# Patient Record
Sex: FEMALE | Race: BLACK OR AFRICAN AMERICAN | Employment: STUDENT | URBAN - METROPOLITAN AREA
[De-identification: names, ages, dates, MRNs, and addresses within clinical notes are randomized per-mention and may not be internally consistent; named-entity substitution may affect disease eponyms.]

---

## 2017-06-09 ENCOUNTER — OFFICE VISIT (OUTPATIENT)
Dept: INTERNAL MEDICINE CLINIC | Age: 27
End: 2017-06-09

## 2017-06-09 VITALS
WEIGHT: 170.6 LBS | RESPIRATION RATE: 16 BRPM | BODY MASS INDEX: 30.23 KG/M2 | OXYGEN SATURATION: 99 % | TEMPERATURE: 98.3 F | SYSTOLIC BLOOD PRESSURE: 114 MMHG | HEART RATE: 73 BPM | HEIGHT: 63 IN | DIASTOLIC BLOOD PRESSURE: 72 MMHG

## 2017-06-09 DIAGNOSIS — R63.5 WEIGHT GAIN: ICD-10-CM

## 2017-06-09 DIAGNOSIS — F41.8 DEPRESSION WITH ANXIETY: ICD-10-CM

## 2017-06-09 DIAGNOSIS — E66.9 OBESITY (BMI 30-39.9): ICD-10-CM

## 2017-06-09 DIAGNOSIS — F51.04 PSYCHOPHYSIOLOGICAL INSOMNIA: Primary | ICD-10-CM

## 2017-06-09 DIAGNOSIS — Z23 ENCOUNTER FOR IMMUNIZATION: ICD-10-CM

## 2017-06-09 RX ORDER — HYDROXYZINE 25 MG/1
25 TABLET, FILM COATED ORAL
Qty: 30 TAB | Refills: 0 | Status: SHIPPED | OUTPATIENT
Start: 2017-06-09 | End: 2017-06-19

## 2017-06-09 RX ORDER — FLUOXETINE HYDROCHLORIDE 20 MG/1
CAPSULE ORAL
Qty: 60 CAP | Refills: 1 | Status: SHIPPED | OUTPATIENT
Start: 2017-06-09 | End: 2017-08-12 | Stop reason: SDUPTHER

## 2017-06-09 NOTE — PATIENT INSTRUCTIONS
It was a pleasure to see you! As discussed: You received the tetanus/pertussis vaccine today. Please see vaccine handout for more information and let us know if you have any reactions or concerns after the vaccination. Anxiety  I have prescribed hydroxyzine a nonhabit forming medication for use as needed for anxiety along with Prozac. Take as prescribed. Try eating \"mood foods\"   Exercise at least 5 mins a day   See below for more information         Anxiety Disorder: Care Instructions  Your Care Instructions  Anxiety is a normal reaction to stress. Difficult situations can cause you to have symptoms such as sweaty palms and a nervous feeling. In an anxiety disorder, the symptoms are far more severe. Constant worry, muscle tension, trouble sleeping, nausea and diarrhea, and other symptoms can make normal daily activities difficult or impossible. These symptoms may occur for no reason, and they can affect your work, school, or social life. Medicines, counseling, and self-care can all help. Follow-up care is a key part of your treatment and safety. Be sure to make and go to all appointments, and call your doctor if you are having problems. It's also a good idea to know your test results and keep a list of the medicines you take. How can you care for yourself at home? · Take medicines exactly as directed. Call your doctor if you think you are having a problem with your medicine. · Go to your counseling sessions and follow-up appointments. · Recognize and accept your anxiety. Then, when you are in a situation that makes you anxious, say to yourself, \"This is not an emergency. I feel uncomfortable, but I am not in danger. I can keep going even if I feel anxious. \"  · Be kind to your body:  ¨ Relieve tension with exercise or a massage. ¨ Get enough rest.  ¨ Avoid alcohol, caffeine, nicotine, and illegal drugs. They can increase your anxiety level and cause sleep problems.   ¨ Learn and do relaxation techniques. See below for more about these techniques. · Engage your mind. Get out and do something you enjoy. Go to a funny movie, or take a walk or hike. Plan your day. Having too much or too little to do can make you anxious. · Keep a record of your symptoms. Discuss your fears with a good friend or family member, or join a support group for people with similar problems. Talking to others sometimes relieves stress. · Get involved in social groups, or volunteer to help others. Being alone sometimes makes things seem worse than they are. · Get at least 30 minutes of exercise on most days of the week to relieve stress. Walking is a good choice. You also may want to do other activities, such as running, swimming, cycling, or playing tennis or team sports. Relaxation techniques  Do relaxation exercises 10 to 20 minutes a day. You can play soothing, relaxing music while you do them, if you wish. · Tell others in your house that you are going to do your relaxation exercises. Ask them not to disturb you. · Find a comfortable place, away from all distractions and noise. · Lie down on your back, or sit with your back straight. · Focus on your breathing. Make it slow and steady. · Breathe in through your nose. Breathe out through either your nose or mouth. · Breathe deeply, filling up the area between your navel and your rib cage. Breathe so that your belly goes up and down. · Do not hold your breath. · Breathe like this for 5 to 10 minutes. Notice the feeling of calmness throughout your whole body. As you continue to breathe slowly and deeply, relax by doing the following for another 5 to 10 minutes:  · Tighten and relax each muscle group in your body. You can begin at your toes and work your way up to your head. · Imagine your muscle groups relaxing and becoming heavy. · Empty your mind of all thoughts. · Let yourself relax more and more deeply.   · Become aware of the state of calmness that surrounds you.  · When your relaxation time is over, you can bring yourself back to alertness by moving your fingers and toes and then your hands and feet and then stretching and moving your entire body. Sometimes people fall asleep during relaxation, but they usually wake up shortly afterward. · Always give yourself time to return to full alertness before you drive a car or do anything that might cause an accident if you are not fully alert. Never play a relaxation tape while you drive a car. When should you call for help? Call 911 anytime you think you may need emergency care. For example, call if:  · You feel you cannot stop from hurting yourself or someone else. Keep the numbers for these national suicide hotlines: 8-758-837-TALK (1-955-773-803.283.4416) and 2-117-OEMGSVJ (9-048-150-447.331.3704). If you or someone you know talks about suicide or feeling hopeless, get help right away. Watch closely for changes in your health, and be sure to contact your doctor if:  · You have anxiety or fear that affects your life. · You have symptoms of anxiety that are new or different from those you had before. Where can you learn more? Go to http://jesus-travon.info/. Enter P754 in the search box to learn more about \"Anxiety Disorder: Care Instructions. \"  Current as of: July 26, 2016  Content Version: 11.2  © 4944-5069 [employer]. Care instructions adapted under license by [employer] (which disclaims liability or warranty for this information). If you have questions about a medical condition or this instruction, always ask your healthcare professional. [address] any warranty or liability for your use of this information.

## 2017-06-09 NOTE — PROGRESS NOTES
HISTORY OF PRESENT ILLNESS  Colette Story is a 23 y.o. female. HPI   Cardiovascular Review  The patient has obesity. Diet and Lifestyle: nonsmoker,  has recently changed her diet- salads and healthy snack   Home BP Monitoring: is not measured at home. Pertinent ROS: taking medications as instructed, no medication side effects noted, no TIA's, no chest pain on exertion, no dyspnea on exertion, no swelling of ankles. Depression with anxiety   Patient is seen for followup of depression. Treatment includes no meds and group therapy. Plans to start individual.   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    she denies suicidal thoughts without plan and suicidal thoughts with specific plan. she experiences the following side effects from the treatment: none. Review of Systems   Constitutional: Negative for diaphoresis, fever and weight loss. Eyes: Negative for blurred vision and pain. Respiratory: Negative for shortness of breath. Cardiovascular: Negative for chest pain, orthopnea and leg swelling. Neurological: Negative for focal weakness and headaches. Psychiatric/Behavioral: Negative for depression. Patient Active Problem List    Diagnosis Date Noted    Obesity (BMI 30-39.9) 06/09/2017    Depression with anxiety 06/09/2017    Dysthymia 12/29/2016    GERD (gastroesophageal reflux disease) 02/11/2016    Allergic rhinitis 08/11/2011       Current Outpatient Prescriptions   Medication Sig Dispense Refill    FLUoxetine (PROZAC) 20 mg capsule Week 1: Take 1 tab by mouth in AM, Week 2 & beyond: take 2 tabs by mouth in AM 60 Cap 1    hydrOXYzine HCl (ATARAX) 25 mg tablet Take 1 Tab by mouth three (3) times daily as needed for Anxiety for up to 10 days.  30 Tab 0    ranitidine (ZANTAC) 300 mg tablet Take 1 Tab by mouth daily. 30 Tab 2    ibuprofen (MOTRIN) 800 mg tablet Take 1 Tab by mouth every eight (8) hours as needed (Headache). 30 Tab 0    fluticasone (FLONASE) 50 mcg/actuation nasal spray nightly.  bacitracin (BACITRACIN) ointment Apply  to affected area two (2) times a day. 30 g 0    APRI 0.15-0.03 mg per tablet          No Known Allergies   Visit Vitals    /72 (BP 1 Location: Right arm, BP Patient Position: Sitting)    Pulse 73    Temp 98.3 °F (36.8 °C) (Oral)    Resp 16    Ht 5' 3\" (1.6 m)    Wt 170 lb 9.6 oz (77.4 kg)    SpO2 99%    BMI 30.22 kg/m2       Physical Exam   Constitutional: She is oriented to person, place, and time. No distress. Cardiovascular: Normal rate, regular rhythm and normal heart sounds. Pulmonary/Chest: Breath sounds normal. No respiratory distress. She has no wheezes. She has no rales. Neurological: She is alert and oriented to person, place, and time. Psychiatric:   Tearful intermittent        ASSESSMENT and Chio Smith was seen today for weight management and depression. Diagnoses and all orders for this visit:    Psychophysiological insomnia- see below     Depression with anxiety -  Will start  Prozac and titrate as need, Risk vs benefits discussed. Atarax  bridge for severe sx. Also explained the biochemical basis of anxiety and the need for long term maintenance therapy. Instructed patient t Patient verbalized understandingo contact office or 911 promptly should condition worsen or any new symptoms appear and provided on-call telephone numbers. IF THE PATIENT HAS ANY SUICIDAL OR HOMICIDAL IDEATION, CALL THE OFFICE, DISCUSS WITH A SUPPORT MEMBER OR GO TO THE ER IMMEDIATELY. Patient was agreeable with this plan. -     CBC WITH AUTOMATED DIFF  -     METABOLIC PANEL, COMPREHENSIVE  -     THYROID PANEL  -     TSH 3RD GENERATION  -     FLUoxetine (PROZAC) 20 mg capsule;  Week 1: Take 1 tab by mouth in AM, Week 2 & beyond: take 2 tabs by mouth in AM  -     hydrOXYzine HCl (ATARAX) 25 mg tablet; Take 1 Tab by mouth three (3) times daily as needed for Anxiety for up to 10 days. Weight gain  -     VITAMIN D, 25 HYDROXY  -     HEMOGLOBIN A1C WITH EAG    Obesity (BMI 30-39. 9)- I have reviewed/discussed the above normal BMI with the patient. I have recommended the following interventions: dietary management education, guidance, and counseling . Dana Wan -     VITAMIN D, 25 HYDROXY  -     HEMOGLOBIN A1C WITH EAG    Encounter for immunization - last tdap >5 yrs. Requests booster as she has had 2 needlesticks at work (from her colleagues)   -     TETANUS, DIPHTHERIA TOXOIDS AND ACELLULAR PERTUSSIS VACCINE (TDAP), IN INDIVIDS. >=7, IM  -     ND IMMUNIZ ADMIN,1 SINGLE/COMB VAC/TOXOID    Other orders  -     Cancel: LIPID PANEL      Follow-up Disposition:  Return in about 3 months (around 9/9/2017) for Follow-up In office (send All4Staff message in 4-6 weeks) . Medication risks/benefits/costs/interactions/alternatives discussed with patient. Enio Lawrencealonso  was given an after visit summary which includes diagnoses, current medications, & vitals. she expressed understanding with the diagnosis and plan.

## 2017-06-09 NOTE — PROGRESS NOTES
Chief Complaint   Patient presents with    Weight Management    Depression     1. Have you been to the ER, urgent care clinic since your last visit? Hospitalized since your last visit? No    2. Have you seen or consulted any other health care providers outside of the [address] [place] [address] since your last visit? Include any pap smears or colon screening. No     Patient states would like to start antidepressant    Patient received Tdap in office today. Administered Boostrix 0.5 ml in right deltoid, IM.  Pt tolerated well

## 2017-11-02 ENCOUNTER — OFFICE VISIT (OUTPATIENT)
Dept: INTERNAL MEDICINE CLINIC | Age: 27
End: 2017-11-02

## 2017-11-02 VITALS
TEMPERATURE: 98.3 F | HEIGHT: 63 IN | BODY MASS INDEX: 32.11 KG/M2 | DIASTOLIC BLOOD PRESSURE: 64 MMHG | SYSTOLIC BLOOD PRESSURE: 106 MMHG | RESPIRATION RATE: 16 BRPM | OXYGEN SATURATION: 100 % | HEART RATE: 79 BPM | WEIGHT: 181.2 LBS

## 2017-11-02 DIAGNOSIS — K59.00 CONSTIPATION, UNSPECIFIED CONSTIPATION TYPE: ICD-10-CM

## 2017-11-02 DIAGNOSIS — R10.9 ABDOMINAL PAIN, UNSPECIFIED ABDOMINAL LOCATION: Primary | ICD-10-CM

## 2017-11-02 LAB
BILIRUB UR QL STRIP: NORMAL
GLUCOSE UR-MCNC: NEGATIVE MG/DL
KETONES P FAST UR STRIP-MCNC: NEGATIVE MG/DL
PH UR STRIP: 6 [PH] (ref 4.6–8)
PROT UR QL STRIP: NORMAL MG/DL
SP GR UR STRIP: 1.02 (ref 1–1.03)
UA UROBILINOGEN AMB POC: NORMAL (ref 0.2–1)
URINALYSIS CLARITY POC: NORMAL
URINALYSIS COLOR POC: NORMAL
URINE BLOOD POC: NORMAL
URINE LEUKOCYTES POC: NORMAL
URINE NITRITES POC: NEGATIVE

## 2017-11-02 RX ORDER — NITROFURANTOIN 25; 75 MG/1; MG/1
100 CAPSULE ORAL 2 TIMES DAILY
Qty: 10 CAP | Refills: 0 | Status: SHIPPED | OUTPATIENT
Start: 2017-11-02 | End: 2018-10-04 | Stop reason: ALTCHOICE

## 2017-11-02 NOTE — MR AVS SNAPSHOT
Visit Information Date & Time Provider Department Dept. Phone Encounter #  
 11/2/2017  1:30 PM Sarah Brandt, [address] [facility] [phone] [id number] Upcoming Health Maintenance Date Due INFLUENZA AGE 9 TO ADULT 8/1/2017 PAP AKA CERVICAL CYTOLOGY 9/8/2018 DTaP/Tdap/Td series (3 - Td) 6/9/2027 Allergies as of 11/2/2017  Review Complete On: 11/2/2017 By: Naveen Flores LPN No Known Allergies Current Immunizations  Reviewed on 11/24/2015 Name Date H1N1 FLU VACCINE 10/1/2009 Hepatitis A Vaccine 6/16/2009, 3/24/2009 Hepatitis B Vaccine 8/7/1992, 6/25/1992, 4/23/1992 Human Papillomavirus 10/3/2007, 3/20/2007, 10/24/2006 Influenza Vaccine 10/16/2014 MMR Vaccine 10/22/2002, 11/24/1992 Meningococcal Vaccine 3/24/2009 TD Vaccine 4/23/1993 TDAP Vaccine 3/24/2009 Tdap 6/9/2017 Not reviewed this visit You Were Diagnosed With   
  
 Codes Comments Abdominal pain, unspecified abdominal location    -  Primary ICD-10-CM: R10.9 ICD-9-CM: 789.00 Constipation, unspecified constipation type     ICD-10-CM: K59.00 ICD-9-CM: 564.00 Vitals BP Pulse Temp Resp Height(growth percentile) Weight(growth percentile) 106/64 (BP 1 Location: Right arm, BP Patient Position: Sitting) 79 98.3 °F (36.8 °C) (Oral) 16 5' 3\" (1.6 m) 181 lb 3.2 oz (82.2 kg) LMP SpO2 BMI OB Status Smoking Status 10/30/2017 100% 32.1 kg/m2 Having regular periods Never Smoker Vitals History BMI and BSA Data Body Mass Index Body Surface Area  
 32.1 kg/m 2 1.91 m 2 Preferred Pharmacy Pharmacy Name Phone [id number], [place] - [address] [phone] Your Updated Medication List  
  
   
This list is accurate as of: 11/2/17  1:58 PM.  Always use your most recent med list.  
  
  
  
  
 APRI 0.15-0.03 mg Tab Generic drug:  desogestrel-ethinyl estradiol  
  
 bacitracin zinc ointment Commonly known as:  BACITRACIN Apply  to affected area two (2) times a day. FLONASE 50 mcg/actuation nasal spray Generic drug:  fluticasone  
nightly. FLUoxetine 20 mg capsule Commonly known as:  PROzac WEEK 1: TAKE 1 TAB BY MOUTH IN MORNING, WEEK 2 & BEYOND: TAKE 2 TABS BY MOUTH IN MORNING  
  
 ibuprofen 800 mg tablet Commonly known as:  MOTRIN Take 1 Tab by mouth every eight (8) hours as needed (Headache). nitrofurantoin (macrocrystal-monohydrate) 100 mg capsule Commonly known as:  MACROBID Take 1 Cap by mouth two (2) times a day. raNITIdine 300 mg tablet Commonly known as:  ZANTAC Take 1 Tab by mouth daily. Prescriptions Sent to Pharmacy Refills  
 nitrofurantoin, macrocrystal-monohydrate, (MACROBID) 100 mg capsule 0 Sig: Take 1 Cap by mouth two (2) times a day. Class: Normal  
 Pharmacy: [facility]/pharmacy [address] [place] [address] [address]  #: [phone] Route: Oral  
  
We Performed the Following CULTURE, URINE [id number] CPT(R)] URINALYSIS W/ RFLX MICROSCOPIC [25849 CPT(R)] Patient Instructions Miralax 1 capful in 8 oz water daily Macrobid one twice daily x 5 days Increase your fluids Call or return to clinic if these symptoms worsen or fail to improve as anticipated. Introducing [facility]! Dear Kaylyn Lainez: Thank you for requesting a [email or web address] account. Our records indicate that you already have an active [email or web address] account. You can access your account anytime at [email or web address] Did you know that you can access your hospital and ER discharge instructions at any time in [email or web address]? You can also review all of your test results from your hospital stay or ER visit. Additional Information If you have questions, please visit the Frequently Asked Questions section of the [employer] website at [email or web address] Remember, [employer]ire is NOT to be used for urgent needs. For medical emergencies, dial 911. Now available from your iPhone and Android! Please provide this summary of care documentation to your next provider. Your primary care clinician is listed as Gerardo Haines. If you have any questions after today's visit, please call [phone].

## 2017-11-02 NOTE — PROGRESS NOTES
HISTORY OF PRESENT ILLNESS  Ace Huddleston is a 33 y.o. female. HPI  Patient reports 2 day history of abdominal fullness, abdominal cramping and bloating with constipation. She has taken Bicitrate and Metamucil, with good bowel movement results, but still feels constipated. She admits to not typically drinking a lot of fluids, and has tried to increase her water x 2 days. She denies upper abdominal pain, nausea or vomiting. She denies dysuria, or frequency. Denies vaginal discharge, or fevers. She is currently menstrurating. IUD placed early October. Past Medical History:   Diagnosis Date    Headache       Current Outpatient Prescriptions on File Prior to Visit   Medication Sig Dispense Refill    FLUoxetine (PROZAC) 20 mg capsule WEEK 1: TAKE 1 TAB BY MOUTH IN MORNING, WEEK 2 & BEYOND: TAKE 2 TABS BY MOUTH IN MORNING 60 Cap 2    ranitidine (ZANTAC) 300 mg tablet Take 1 Tab by mouth daily. 30 Tab 2    ibuprofen (MOTRIN) 800 mg tablet Take 1 Tab by mouth every eight (8) hours as needed (Headache). 30 Tab 0    fluticasone (FLONASE) 50 mcg/actuation nasal spray nightly.  bacitracin (BACITRACIN) ointment Apply  to affected area two (2) times a day. 30 g 0    APRI 0.15-0.03 mg per tablet        No current facility-administered medications on file prior to visit. ROS  Per [facility]  Physical Exam  Visit Vitals    /64 (BP 1 Location: Right arm, BP Patient Position: Sitting)    Pulse 79    Temp 98.3 °F (36.8 °C) (Oral)    Resp 16    Ht 5' 3\" (1.6 m)    Wt 181 lb 3.2 oz (82.2 kg)    LMP 10/30/2017    SpO2 100%    BMI 32.1 kg/m2    Patient appears well  Heart[de-identified] normal rate, regular rhythm, normal S1, S2, no murmurs, rubs, clicks or gallops.   Chest: clear to auscultation, no wheezes, rales or rhonchi,   Abdomen: soft, normal bowel sounds no mass or hepatosplenomegaly, mild suprapubic tenderness, without rebound   Extremities: no edema  Urine dip: 2+ blood, 1+ leuk, 1+bili 1+ protein  ASSESSMENT and PLAN  UTI : urine culture  Macrobid bid x 5 days  Constipation: Miralax 1 capful in 8 oz water daily  Increase fluids  Call or return to clinic prn if these symptoms worsen or fail to improve as anticipated. Follow-up Disposition:  Return if symptoms worsen or fail to improve. Advised to call back or return to office if symptoms worsen/change/persist.  Discussed expected course/resolution/complications of diagnosis in detail with patient. Medication risks/benefits/costs/interactions/alternatives discussed with patient. She was given an after visit summary which includes diagnoses, current medications, & vitals. She expressed understanding with the diagnosis and plan.

## 2017-11-02 NOTE — PATIENT INSTRUCTIONS
Miralax 1 capful in 8 oz water daily  Macrobid one twice daily x 5 days  Increase your fluids  Call or return to clinic if these symptoms worsen or fail to improve as anticipated.

## 2017-11-02 NOTE — PROGRESS NOTES
Chief Complaint   Patient presents with    Abdominal Pain     1. Have you been to the ER, urgent care clinic since your last visit? Hospitalized since your last visit? No    2. Have you seen or consulted any other health care providers outside of the [address] [place] [address] since your last visit? Include any pap smears or colon screening. No    Patient stated abdominal pain, started Tuesday.  Constipation

## 2017-11-03 LAB
APPEARANCE UR: ABNORMAL
BACTERIA #/AREA URNS HPF: ABNORMAL /[HPF]
BACTERIA UR CULT: NORMAL
BILIRUB UR QL STRIP: NEGATIVE
CASTS URNS QL MICRO: ABNORMAL /LPF
COLOR UR: YELLOW
EPI CELLS #/AREA URNS HPF: >10 /HPF
GLUCOSE UR QL: NEGATIVE
HGB UR QL STRIP: ABNORMAL
KETONES UR QL STRIP: NEGATIVE
LEUKOCYTE ESTERASE UR QL STRIP: ABNORMAL
MICRO URNS: ABNORMAL
MUCOUS THREADS URNS QL MICRO: PRESENT
NITRITE UR QL STRIP: NEGATIVE
PH UR STRIP: 6 [PH] (ref 5–7.5)
PROT UR QL STRIP: ABNORMAL
RBC #/AREA URNS HPF: ABNORMAL /HPF
SP GR UR: 1.02 (ref 1–1.03)
UROBILINOGEN UR STRIP-MCNC: 0.2 MG/DL (ref 0.2–1)
WBC #/AREA URNS HPF: ABNORMAL /HPF

## 2017-11-17 ENCOUNTER — TELEPHONE (OUTPATIENT)
Dept: INTERNAL MEDICINE CLINIC | Age: 27
End: 2017-11-17

## 2018-02-21 ENCOUNTER — TELEPHONE (OUTPATIENT)
Dept: INTERNAL MEDICINE CLINIC | Age: 28
End: 2018-02-21

## 2018-08-17 ENCOUNTER — OFFICE VISIT (OUTPATIENT)
Dept: INTERNAL MEDICINE CLINIC | Age: 28
End: 2018-08-17

## 2018-08-17 VITALS
TEMPERATURE: 99 F | WEIGHT: 176 LBS | OXYGEN SATURATION: 99 % | SYSTOLIC BLOOD PRESSURE: 102 MMHG | RESPIRATION RATE: 16 BRPM | DIASTOLIC BLOOD PRESSURE: 66 MMHG | HEIGHT: 63 IN | HEART RATE: 76 BPM | BODY MASS INDEX: 31.18 KG/M2

## 2018-08-17 DIAGNOSIS — F41.9 ANXIETY: Primary | ICD-10-CM

## 2018-08-17 DIAGNOSIS — Z00.00 ROUTINE GENERAL MEDICAL EXAMINATION AT A HEALTH CARE FACILITY: ICD-10-CM

## 2018-08-17 DIAGNOSIS — F32.A MILD DEPRESSION: ICD-10-CM

## 2018-08-17 RX ORDER — FLUOXETINE 20 MG/1
40 TABLET ORAL DAILY
Qty: 60 TAB | Refills: 2 | Status: SHIPPED | OUTPATIENT
Start: 2018-08-17 | End: 2018-10-04

## 2018-08-17 NOTE — MR AVS SNAPSHOT
[address] [place] [address] 
[phone] Patient: Ace Huddleston MRN: [id number] UGS:9/70/9022 Visit Information Date & Time Provider Department Dept. Phone Encounter #  
 8/17/2018  1:30 PM Caryle Macho, MD [facility] Internal Medicine [phone] [id number] Follow-up Instructions Return in about 6 weeks (around 9/28/2018) for Physical - 30 minute appointment. Upcoming Health Maintenance Date Due Influenza Age 5 to Adult 8/1/2018 PAP AKA CERVICAL CYTOLOGY 9/8/2018 DTaP/Tdap/Td series (3 - Td) 6/9/2027 Allergies as of 8/17/2018  Review Complete On: 8/17/2018 By: Caryle Macho, MD  
 No Known Allergies Current Immunizations  Reviewed on 11/24/2015 Name Date H1N1 FLU VACCINE 10/1/2009 Hepatitis A Vaccine 6/16/2009, 3/24/2009 Hepatitis B Vaccine 8/7/1992, 6/25/1992, 4/23/1992 Human Papillomavirus 10/3/2007, 3/20/2007, 10/24/2006 Influenza Vaccine 10/16/2014 MMR Vaccine 10/22/2002, 11/24/1992 Meningococcal Vaccine 3/24/2009 TD Vaccine 4/23/1993 TDAP Vaccine 3/24/2009 Tdap 6/9/2017 Not reviewed this visit You Were Diagnosed With   
  
 Codes Comments Anxiety    -  Primary ICD-10-CM: F41.9 ICD-9-CM: 300.00 Mild depression (HCC)     ICD-10-CM: F32.0 ICD-9-CM: 463 Routine general medical examination at a health care facility     ICD-10-CM: Z00.00 ICD-9-CM: V70.0 Vitals BP Pulse Temp Resp Height(growth percentile) Weight(growth percentile) 102/66 (BP 1 Location: Right arm, BP Patient Position: Sitting) 76 99 °F (37.2 °C) (Oral) 16 5' 3\" (1.6 m) 176 lb (79.8 kg) LMP SpO2 BMI OB Status Smoking Status 08/06/2018 99% 31.18 kg/m2 Having regular periods Never Smoker Vitals History BMI and BSA Data Body Mass Index Body Surface Area  
 31.18 kg/m 2 1.88 m 2 Preferred Pharmacy Pharmacy Name Phone [id number] [address] [phone] Your Updated Medication List  
  
   
This list is accurate as of 8/17/18  2:06 PM.  Always use your most recent med list.  
  
  
  
  
 APRI 0.15-0.03 mg Tab Generic drug:  desogestrel-ethinyl estradiol  
  
 bacitracin zinc ointment Commonly known as:  BACITRACIN Apply  to affected area two (2) times a day. FLONASE 50 mcg/actuation nasal spray Generic drug:  fluticasone  
nightly. FLUoxetine 20 mg tablet Commonly known as:  PROzac Take 2 Tabs by mouth daily. ibuprofen 800 mg tablet Commonly known as:  MOTRIN Take 1 Tab by mouth every eight (8) hours as needed (Headache). nitrofurantoin (macrocrystal-monohydrate) 100 mg capsule Commonly known as:  MACROBID Take 1 Cap by mouth two (2) times a day. raNITIdine 300 mg tablet Commonly known as:  ZANTAC Take 1 Tab by mouth daily. Prescriptions Sent to Pharmacy Refills FLUoxetine (PROZAC) 20 mg tablet 2 Sig: Take 2 Tabs by mouth daily. Class: Normal  
 Pharmacy: [facility]/pharmacy [address] [place] [address] [address] Ph #: [phone] Route: Oral  
  
We Performed the Following CBC WITH AUTOMATED DIFF [57453 CPT(R)] LIPID PANEL [49152 CPT(R)] METABOLIC PANEL, COMPREHENSIVE [08380 CPT(R)] T4, FREE [id number] CPT(R)] TSH 3RD GENERATION [52850 CPT(R)] VITAMIN D, 25 HYDROXY [id number] CPT(R)] Follow-up Instructions Return in about 6 weeks (around 9/28/2018) for Physical - 30 minute appointment. Patient Instructions It was a pleasure to see you! As discussed: 
 
Increase Prozac to 40mg daily. Think about what you want to do with this time off before you match. Continue seeing your counselor and other positive self care. Recovering From Depression: Care Instructions Your Care Instructions Taking good care of yourself is important as you recover from depression. In time, your symptoms will fade as your treatment takes hold. Do not give up. Instead, focus your energy on getting better. Your mood will improve. It just takes some time. Focus on things that can help you feel better, such as being with friends and family, eating well, and getting enough rest. But take things slowly. Do not do too much too soon. You will begin to feel better gradually. Follow-up care is a key part of your treatment and safety. Be sure to make and go to all appointments, and call your doctor if you are having problems. It's also a good idea to know your test results and keep a list of the medicines you take. How can you care for yourself at home? Be realistic · If you have a large task to do, break it up into smaller steps you can handle, and just do what you can. · You may want to put off important decisions until your depression has lifted. If you have plans that will have a major impact on your life, such as marriage, divorce, or a job change, try to wait a bit. Talk it over with friends and loved ones who can help you look at the overall picture first. 
· Reaching out to people for help is important. Do not isolate yourself. Let your family and friends help you. Find someone you can trust and confide in, and talk to that person. · Be patient, and be kind to yourself. Remember that depression is not your fault and is not something you can overcome with willpower alone. Treatment is necessary for depression, just like for any other illness. Feeling better takes time, and your mood will improve little by little. Stay active · Stay busy and get outside. Take a walk, or try some other light exercise. · Talk with your doctor about an exercise program. Exercise can help with mild depression. · Go to a movie or concert.  Take part in a Yazidi activity or other social gathering. Go to a "[employer] game. · Ask a friend to have dinner with you. Take care of yourself · Eat a balanced diet with plenty of fresh fruits and vegetables, whole grains, and lean protein. If you have lost your appetite, eat small snacks rather than large meals. · Avoid drinking alcohol or using illegal drugs. Do not take medicines that have not been prescribed for you. They may interfere with medicines you may be taking for depression, or they may make your depression worse. · Take your medicines exactly as they are prescribed. You may start to feel better within 1 to 3 weeks of taking antidepressant medicine. But it can take as many as 6 to 8 weeks to see more improvement. If you have questions or concerns about your medicines, or if you do not notice any improvement by 3 weeks, talk to your doctor. · If you have any side effects from your medicine, tell your doctor. Antidepressants can make you feel tired, dizzy, or nervous. Some people have dry mouth, constipation, headaches, sexual problems, or diarrhea. Many of these side effects are mild and will go away on their own after you have been taking the medicine for a few weeks. Some may last longer. Talk to your doctor if side effects are bothering you too much. You might be able to try a different medicine. · Get enough sleep. If you have problems sleeping: ¨ Go to bed at the same time every night, and get up at the same time every morning. ¨ Keep your bedroom dark and quiet. ¨ Do not exercise after 5:00 p.m. ¨ Avoid drinks with caffeine after 5:00 p.m. · Avoid sleeping pills unless they are prescribed by the doctor treating your depression. Sleeping pills may make you groggy during the day, and they may interact with other medicine you are taking. · If you have any other illnesses, such as diabetes, heart disease, or high blood pressure, make sure to continue with your treatment.  Tell your doctor about all of the medicines you take, including those with or without a prescription. · Keep the numbers for these national suicide hotlines: 1-353-207-TALK (5-864.404.8495) and 8-962-XRQYOYF (0-524.548.7974). If you or someone you know talks about suicide or feeling hopeless, get help right away. When should you call for help? Call 911 anytime you think you may need emergency care. For example, call if: 
  · You feel like hurting yourself or someone else.  
  · Someone you know has depression and is about to attempt or is attempting suicide.  
[facility] your doctor now or seek immediate medical care if: 
  · You hear voices.  
  · Someone you know has depression and: 
¨ Starts to give away his or her possessions. ¨ Uses illegal drugs or drinks alcohol heavily. ¨ Talks or writes about death, including writing suicide notes or talking about guns, knives, or pills. ¨ Starts to spend a lot of time alone. ¨ Acts very aggressively or suddenly appears calm.  
 Watch closely for changes in your health, and be sure to contact your doctor if: 
  · You do not get better as expected. Where can you learn more? Go to http://jesus-travon.info/. Enter O953 in the search box to learn more about \"Recovering From Depression: Care Instructions. \" Current as of: December 7, 2017 Content Version: 11.7 © 8909-0584 [employer]. Care instructions adapted under license by [employer] (which disclaims liability or warranty for this information). If you have questions about a medical condition or this instruction, always ask your healthcare professional. [address] any warranty or liability for your use of this information. Introducing [facility] & HEALTH SERVICES! Dear Chris Looney: Thank you for requesting a StayNTouch account. Our records indicate that you already have an active StayNTouch account.   You can access your account anytime at [email or web address]k Did you know that you can access your hospital and ER discharge instructions at any time in [employer]? You can also review all of your test results from your hospital stay or ER visit. Additional Information If you have questions, please visit the Frequently Asked Questions section of the [employer] website at [email or web address] Remember, [employer] is NOT to be used for urgent needs. For medical emergencies, dial 911. Now available from your iPhone and Android! Please provide this summary of care documentation to your next provider. Your primary care clinician is listed as Nichole Tan. If you have any questions after today's visit, please call [phone].

## 2018-08-17 NOTE — PROGRESS NOTES
Chief Complaint   Patient presents with    Anxiety     1. Have you been to the ER, urgent care clinic since your last visit? Hospitalized since your last visit? Yes [facility] Reason for visit: Anxiety attack    2. Have you seen or consulted any other health care providers outside of the [address] [place] [address] since your last visit? Include any pap smears or colon screening. Yes Where:  Therapist Reason for visit: Anxiety     complains of continue anxiety medication and should increase

## 2018-08-17 NOTE — PATIENT INSTRUCTIONS
It was a pleasure to see you! As discussed:    Increase Prozac to 40mg daily. Think about what you want to do with this time off before you match. Continue seeing your counselor and other positive self care. Recovering From Depression: Care Instructions  Your Care Instructions  Taking good care of yourself is important as you recover from depression. In time, your symptoms will fade as your treatment takes hold. Do not give up. Instead, focus your energy on getting better. Your mood will improve. It just takes some time. Focus on things that can help you feel better, such as being with friends and family, eating well, and getting enough rest. But take things slowly. Do not do too much too soon. You will begin to feel better gradually. Follow-up care is a key part of your treatment and safety. Be sure to make and go to all appointments, and call your doctor if you are having problems. It's also a good idea to know your test results and keep a list of the medicines you take. How can you care for yourself at home? Be realistic  · If you have a large task to do, break it up into smaller steps you can handle, and just do what you can. · You may want to put off important decisions until your depression has lifted. If you have plans that will have a major impact on your life, such as marriage, divorce, or a job change, try to wait a bit. Talk it over with friends and loved ones who can help you look at the overall picture first.  · Reaching out to people for help is important. Do not isolate yourself. Let your family and friends help you. Find someone you can trust and confide in, and talk to that person. · Be patient, and be kind to yourself. Remember that depression is not your fault and is not something you can overcome with willpower alone. Treatment is necessary for depression, just like for any other illness. Feeling better takes time, and your mood will improve little by little.   Stay active  · Stay busy and get outside. Take a walk, or try some other light exercise. · Talk with your doctor about an exercise program. Exercise can help with mild depression. · Go to a movie or concert. Take part in a Sabianist activity or other social gathering. Go to a ball game. · Ask a friend to have dinner with you. Take care of yourself  · Eat a balanced diet with plenty of fresh fruits and vegetables, whole grains, and lean protein. If you have lost your appetite, eat small snacks rather than large meals. · Avoid drinking alcohol or using illegal drugs. Do not take medicines that have not been prescribed for you. They may interfere with medicines you may be taking for depression, or they may make your depression worse. · Take your medicines exactly as they are prescribed. You may start to feel better within 1 to 3 weeks of taking antidepressant medicine. But it can take as many as 6 to 8 weeks to see more improvement. If you have questions or concerns about your medicines, or if you do not notice any improvement by 3 weeks, talk to your doctor. · If you have any side effects from your medicine, tell your doctor. Antidepressants can make you feel tired, dizzy, or nervous. Some people have dry mouth, constipation, headaches, sexual problems, or diarrhea. Many of these side effects are mild and will go away on their own after you have been taking the medicine for a few weeks. Some may last longer. Talk to your doctor if side effects are bothering you too much. You might be able to try a different medicine. · Get enough sleep. If you have problems sleeping:  ¨ Go to bed at the same time every night, and get up at the same time every morning. ¨ Keep your bedroom dark and quiet. ¨ Do not exercise after 5:00 p.m. ¨ Avoid drinks with caffeine after 5:00 p.m. · Avoid sleeping pills unless they are prescribed by the doctor treating your depression.  Sleeping pills may make you groggy during the day, and they may interact with other medicine you are taking. · If you have any other illnesses, such as diabetes, heart disease, or high blood pressure, make sure to continue with your treatment. Tell your doctor about all of the medicines you take, including those with or without a prescription. · Keep the numbers for these national suicide hotlines: 9-932-624-TALK (6-674.678.9582) and 3-933-DGOPPVH (3-845.422.1004). If you or someone you know talks about suicide or feeling hopeless, get help right away. When should you call for help? Call 911 anytime you think you may need emergency care. For example, call if:    · You feel like hurting yourself or someone else.     · Someone you know has depression and is about to attempt or is attempting suicide.   [facility] your doctor now or seek immediate medical care if:    · You hear voices.     · Someone you know has depression and:  ¨ Starts to give away his or her possessions. ¨ Uses illegal drugs or drinks alcohol heavily. ¨ Talks or writes about death, including writing suicide notes or talking about guns, knives, or pills. ¨ Starts to spend a lot of time alone. ¨ Acts very aggressively or suddenly appears calm.    Watch closely for changes in your health, and be sure to contact your doctor if:    · You do not get better as expected. Where can you learn more? Go to http://jesus-travon.info/. Enter C273 in the search box to learn more about \"Recovering From Depression: Care Instructions. \"  Current as of: December 7, 2017  Content Version: 11.7  © 6600-6980 [employer]. Care instructions adapted under license by [employer] (which disclaims liability or warranty for this information). If you have questions about a medical condition or this instruction, always ask your healthcare professional. [address] any warranty or liability for your use of this information.

## 2018-08-17 NOTE — PROGRESS NOTES
HISTORY OF PRESENT ILLNESS  Denilson Pearce is a 33 y.o. female. HPI   Depression with Anxiety   Patient is seen for followup of depression with anxiety. Treatment includes Prozac and individual therapy. Tristan Dumont. Self Care Inventory  Sleep:  7hrs/ night most days-takes melatonin   Eating Habits: Okay- still having sugar   Support: strong  Spiritual Life: important he  Exercise: >2.5hrs/ week   Stress: Moderate   she denies suicidal thoughts with specific plan. she experiences the following side effects from the treatment: none. PHQ over the last two weeks 8/17/2018   Little interest or pleasure in doing things Not at all   Feeling down, depressed, irritable, or hopeless Not at all   Total Score PHQ 2 0   Trouble falling or staying asleep, or sleeping too much -   Feeling tired or having little energy -       SHx: Didn't match in Ob/gyn [id number]; She is taking an extended 4th year. Will be off January 2019. Roxane HPI   Patient Active Problem List    Diagnosis Date Noted    Mild depression ([facility] [address] 08/17/2018    Obesity (BMI 30-39.9) 06/09/2017    Depression with anxiety 06/09/2017    Dysthymia 12/29/2016    GERD (gastroesophageal reflux disease) 02/11/2016    Allergic rhinitis 08/11/2011       Current Outpatient Prescriptions   Medication Sig Dispense Refill    FLUoxetine (PROZAC) 20 mg capsule WEEK 1: TAKE 1 TAB BY MOUTH IN MORNING, WEEK 2 & BEYOND: TAKE 2 TABS BY MOUTH IN MORNING 60 Cap 2    ranitidine (ZANTAC) 300 mg tablet Take 1 Tab by mouth daily. 30 Tab 2    ibuprofen (MOTRIN) 800 mg tablet Take 1 Tab by mouth every eight (8) hours as needed (Headache). 30 Tab 0    fluticasone (FLONASE) 50 mcg/actuation nasal spray nightly.  nitrofurantoin, macrocrystal-monohydrate, (MACROBID) 100 mg capsule Take 1 Cap by mouth two (2) times a day. 10 Cap 0    bacitracin (BACITRACIN) ointment Apply  to affected area two (2) times a day.  30 g 0    APRI 0.15-0.03 mg per tablet          No Known Allergies   Visit Vitals    /66 (BP 1 Location: Right arm, BP Patient Position: Sitting)    Pulse 76    Temp 99 °F (37.2 °C) (Oral)    Resp 16    Ht 5' 3\" (1.6 m)    Wt 176 lb (79.8 kg)    SpO2 99%    BMI 31.18 kg/m2       Physical Exam   Constitutional: She is oriented to person, place, and time. She appears well-developed. No distress. Eyes: Conjunctivae are normal.   Neck: Neck supple. Cardiovascular: Normal rate, regular rhythm and normal heart sounds. Pulmonary/Chest: Effort normal and breath sounds normal. No respiratory distress. She has no wheezes. She has no rales. She exhibits no tenderness. Neurological: She is alert and oriented to person, place, and time. Skin: Skin is warm. Psychiatric: She has a normal mood and affect. ASSESSMENT and PLAN  Diagnoses and all orders for this visit:    1. Anxiety- not well controlled. Prozac increased to 40mg po qDay. Patient Education:  Reviewed concept of anxiety as biochemical imbalance of neurotransmitters and rationale for treatment. Instructed patient to contact office or 911 promptly should condition worsen or any new symptoms appear and provided on-call telephone numbers. IF THE PATIENT HAS ANY SUICIDAL OR HOMICIDAL IDEATION, CALL THE OFFICE, DISCUSS WITH A SUPPORT MEMBER OR GO TO THE ER IMMEDIATELY. Patient was agreeable with this    -     FLUoxetine (PROZAC) 20 mg tablet; Take 2 Tabs by mouth daily. 2. Mild depression ([address]- stable. Seeing therapist. Patient Education:  Reviewed concept of depression as biochemical imbalance of neurotransmitters and rationale for treatment. Instructed patient to contact office or 911 promptly should condition worsen or any new symptoms appear and provided on-call telephone numbers. IF THE PATIENT HAS ANY SUICIDAL OR HOMICIDAL IDEATION, CALL THE OFFICE, DISCUSS WITH A SUPPORT MEMBER OR GO TO THE ER IMMEDIATELY. Patient was agreeable with this    -     FLUoxetine (PROZAC) 20 mg tablet;  Take 2 Tabs by mouth daily. 3. Routine general medical examination at a health care facility- overdue. Labs ordered. -     METABOLIC PANEL, COMPREHENSIVE  -     TSH 3RD GENERATION  -     T4, FREE  -     LIPID PANEL  -     VITAMIN D, 25 HYDROXY  -     CBC WITH AUTOMATED DIFF      Follow-up Disposition:  Return in about 6 weeks (around 9/28/2018) for Physical - 30 minute appointment. Medication risks/benefits/costs/interactions/alternatives discussed with patient. Cheyenne Yanez  was given an after visit summary which includes diagnoses, current medications, & vitals. she expressed understanding with the diagnosis and plan.

## 2018-09-21 LAB
25(OH)D3+25(OH)D2 SERPL-MCNC: 32.9 NG/ML (ref 30–100)
ALBUMIN SERPL-MCNC: 4.8 G/DL (ref 3.5–5.5)
ALBUMIN/GLOB SERPL: 1.8 {RATIO} (ref 1.2–2.2)
ALP SERPL-CCNC: 87 IU/L (ref 39–117)
ALT SERPL-CCNC: 12 IU/L (ref 0–32)
AST SERPL-CCNC: 18 IU/L (ref 0–40)
BASOPHILS # BLD AUTO: 0 X10E3/UL (ref 0–0.2)
BASOPHILS NFR BLD AUTO: 0 %
BILIRUB SERPL-MCNC: 1 MG/DL (ref 0–1.2)
BUN SERPL-MCNC: 11 MG/DL (ref 6–20)
BUN/CREAT SERPL: 12 (ref 9–23)
CALCIUM SERPL-MCNC: 9.2 MG/DL (ref 8.7–10.2)
CHLORIDE SERPL-SCNC: 102 MMOL/L (ref 96–106)
CHOLEST SERPL-MCNC: 172 MG/DL (ref 100–199)
CO2 SERPL-SCNC: 21 MMOL/L (ref 20–29)
CREAT SERPL-MCNC: 0.95 MG/DL (ref 0.57–1)
EOSINOPHIL # BLD AUTO: 0.1 X10E3/UL (ref 0–0.4)
EOSINOPHIL NFR BLD AUTO: 1 %
ERYTHROCYTE [DISTWIDTH] IN BLOOD BY AUTOMATED COUNT: 13.2 % (ref 12.3–15.4)
GLOBULIN SER CALC-MCNC: 2.6 G/DL (ref 1.5–4.5)
GLUCOSE SERPL-MCNC: 83 MG/DL (ref 65–99)
HCT VFR BLD AUTO: 41.8 % (ref 34–46.6)
HDLC SERPL-MCNC: 52 MG/DL
HGB BLD-MCNC: 13.8 G/DL (ref 11.1–15.9)
IMM GRANULOCYTES # BLD: 0 X10E3/UL (ref 0–0.1)
IMM GRANULOCYTES NFR BLD: 0 %
LDLC SERPL CALC-MCNC: 105 MG/DL (ref 0–99)
LYMPHOCYTES # BLD AUTO: 1.7 X10E3/UL (ref 0.7–3.1)
LYMPHOCYTES NFR BLD AUTO: 20 %
MCH RBC QN AUTO: 28.8 PG (ref 26.6–33)
MCHC RBC AUTO-ENTMCNC: 33 G/DL (ref 31.5–35.7)
MCV RBC AUTO: 87 FL (ref 79–97)
MONOCYTES # BLD AUTO: 0.5 X10E3/UL (ref 0.1–0.9)
MONOCYTES NFR BLD AUTO: 6 %
NEUTROPHILS # BLD AUTO: 6.1 X10E3/UL (ref 1.4–7)
NEUTROPHILS NFR BLD AUTO: 73 %
PLATELET # BLD AUTO: 361 X10E3/UL (ref 150–379)
POTASSIUM SERPL-SCNC: 4.5 MMOL/L (ref 3.5–5.2)
PROT SERPL-MCNC: 7.4 G/DL (ref 6–8.5)
RBC # BLD AUTO: 4.8 X10E6/UL (ref 3.77–5.28)
SODIUM SERPL-SCNC: 141 MMOL/L (ref 134–144)
T4 FREE SERPL-MCNC: 1.26 NG/DL (ref 0.82–1.77)
TRIGL SERPL-MCNC: 75 MG/DL (ref 0–149)
TSH SERPL DL<=0.005 MIU/L-ACNC: 0.85 UIU/ML (ref 0.45–4.5)
VLDLC SERPL CALC-MCNC: 15 MG/DL (ref 5–40)
WBC # BLD AUTO: 8.4 X10E3/UL (ref 3.4–10.8)

## 2018-10-04 ENCOUNTER — OFFICE VISIT (OUTPATIENT)
Dept: INTERNAL MEDICINE CLINIC | Age: 28
End: 2018-10-04

## 2018-10-04 VITALS
SYSTOLIC BLOOD PRESSURE: 142 MMHG | TEMPERATURE: 98.5 F | OXYGEN SATURATION: 97 % | WEIGHT: 181.4 LBS | DIASTOLIC BLOOD PRESSURE: 72 MMHG | BODY MASS INDEX: 32.14 KG/M2 | RESPIRATION RATE: 16 BRPM | HEIGHT: 63 IN | HEART RATE: 74 BPM

## 2018-10-04 DIAGNOSIS — F32.A MILD DEPRESSION: ICD-10-CM

## 2018-10-04 DIAGNOSIS — E66.9 OBESITY (BMI 30-39.9): ICD-10-CM

## 2018-10-04 DIAGNOSIS — F41.8 DEPRESSION WITH ANXIETY: ICD-10-CM

## 2018-10-04 DIAGNOSIS — G47.09 OTHER INSOMNIA: Primary | ICD-10-CM

## 2018-10-04 DIAGNOSIS — F41.9 ANXIETY: ICD-10-CM

## 2018-10-04 RX ORDER — LORAZEPAM 1 MG/1
1 TABLET ORAL
COMMUNITY
Start: 2018-06-11

## 2018-10-04 RX ORDER — FLUOXETINE HYDROCHLORIDE 40 MG/1
40 CAPSULE ORAL DAILY
Qty: 90 CAP | Refills: 2 | Status: SHIPPED | OUTPATIENT
Start: 2018-10-04 | End: 2019-04-15 | Stop reason: SDUPTHER

## 2018-10-04 RX ORDER — BENZONATATE 200 MG/1
CAPSULE ORAL
Refills: 0 | COMMUNITY
Start: 2018-10-02 | End: 2019-04-15 | Stop reason: ALTCHOICE

## 2018-10-04 RX ORDER — PREDNISONE 20 MG/1
TABLET ORAL
Refills: 0 | COMMUNITY
Start: 2018-10-02 | End: 2019-04-15 | Stop reason: ALTCHOICE

## 2018-10-04 NOTE — PROGRESS NOTES
HISTORY OF PRESENT ILLNESS Dolores Cobos is a 33 y.o. female. HPI Depression with anxiety Patient is seen for followup of depression. Treatment includes Prozac and individual therapy, Osei Madison EMMA 7 score 5 Self Care Inventory Sleep: 6 hrs/ night inconsistent Eating Habits: not on eating plan. Support: Yes Spiritual Life: Yes Exercise: >2.5hrs/ week Stress: Moderate  
she denies suicidal thoughts with specific plan. she experiences the following side effects from the treatment: none. PHQ over the last two weeks 10/4/2018 Little interest or pleasure in doing things Not at all Feeling down, depressed, irritable, or hopeless Not at all Total Score PHQ 2 0 Trouble falling or staying asleep, or sleeping too much - Feeling tired or having little energy -  
 
Cold Symptoms Improving. On prednisone. Has completed abx ROSper HPI Patient Active Problem List  
 Diagnosis Date Noted  Mild depression ([facility] [address] 08/17/2018  Obesity (BMI 30-39.9) 06/09/2017  Depression with anxiety 06/09/2017  Dysthymia 12/29/2016  GERD (gastroesophageal reflux disease) 02/11/2016  Allergic rhinitis 08/11/2011 Current Outpatient Prescriptions Medication Sig Dispense Refill  benzonatate (TESSALON) 200 mg capsule TAKE 1 CAPSULE BY MOUTH THREE TIMES A DAY FOR 10 DAYS  0  
 levonorgestrel (KYLEENA IU) Daniel Never  MELATONIN PO Take  by mouth.  LORazepam (ATIVAN) 1 mg tablet Take 1 mg by mouth.  predniSONE (DELTASONE) 20 mg tablet DAY 1-2 TAKE 3 PILLS DAILY, DAYS 3-4 TAKE 2 PILLS DAILY, DAYS 5-6 TAKE 1 PILL DAILY  0  
 FLUoxetine (PROZAC) 20 mg tablet Take 2 Tabs by mouth daily. 60 Tab 2  
 ranitidine (ZANTAC) 300 mg tablet Take 1 Tab by mouth daily. 30 Tab 2  
 bacitracin (BACITRACIN) ointment Apply  to affected area two (2) times a day. 30 g 0  
 ibuprofen (MOTRIN) 800 mg tablet Take 1 Tab by mouth every eight (8) hours as needed (Headache).  30 Tab 0  
  APRI 0.15-0.03 mg per tablet  fluticasone (FLONASE) 50 mcg/actuation nasal spray nightly. No Known Allergies Visit Vitals  /72  Pulse 74  Temp 98.5 °F (36.9 °C) (Oral)  Resp 16  
 Ht 5' 3\" (1.6 m)  Wt 181 lb 6.4 oz (82.3 kg)  SpO2 97%  BMI 32.13 kg/m2 Physical Exam  
Constitutional: She is oriented to person, place, and time. No distress. Cardiovascular: Normal rate and regular rhythm. Pulmonary/Chest: Breath sounds normal. No respiratory distress. She has no wheezes. She has no rales. Neurological: She is alert and oriented to person, place, and time. Psychiatric: She has a normal mood and affect. Lab Results Component Value Date/Time WBC 8.4 09/20/2018 10:37 AM  
HGB 13.8 09/20/2018 10:37 AM  
HCT 41.8 09/20/2018 10:37 AM  
PLATELET 415 27/64/8944 10:37 AM  
MCV 87 09/20/2018 10:37 AM  
 
Lab Results Component Value Date/Time Hemoglobin A1c 5.1 09/13/2016 02:24 PM  
Hemoglobin A1c 5.1 12/07/2015 12:00 AM  
Glucose 83 09/20/2018 10:37 AM  
LDL, calculated 105 (H) 09/20/2018 10:37 AM  
Creatinine 0.95 09/20/2018 10:37 AM  
  
Lab Results Component Value Date/Time Cholesterol, total 172 09/20/2018 10:37 AM  
HDL Cholesterol 52 09/20/2018 10:37 AM  
LDL, calculated 105 (H) 09/20/2018 10:37 AM  
Triglyceride 75 09/20/2018 10:37 AM  
 
Lab Results Component Value Date/Time ALT (SGPT) 12 09/20/2018 10:37 AM  
AST (SGOT) 18 09/20/2018 10:37 AM  
Alk. phosphatase 87 09/20/2018 10:37 AM  
Bilirubin, total 1.0 09/20/2018 10:37 AM  
Albumin 4.8 09/20/2018 10:37 AM  
Protein, total 7.4 09/20/2018 10:37 AM  
PLATELET 680 04/45/9862 10:37 AM  
 
 
Lab Results Component Value Date/Time GFR est non-AA 82 09/20/2018 10:37 AM  
GFR est AA 95 09/20/2018 10:37 AM  
Creatinine 0.95 09/20/2018 10:37 AM  
BUN 11 09/20/2018 10:37 AM  
Sodium 141 09/20/2018 10:37 AM  
Potassium 4.5 09/20/2018 10:37 AM  
Chloride 102 09/20/2018 10:37 AM  
 CO2 21 09/20/2018 10:37 AM  
Magnesium 1.9 09/13/2016 02:24 PM  
 
Lab Results Component Value Date/Time TSH 0.850 09/20/2018 10:37 AM  
T4, Free 1.26 09/20/2018 10:37 AM  
  
Lab Results Component Value Date/Time Glucose 83 09/20/2018 10:37 AM  
   
 
ASSESSMENT and PLAN Diagnoses and all orders for this visit: 
 
1. Other insomnia-present for several years. Given its implications on weight and her mental health would benefit from sleep medicine evaluation to rule out primary sleep disorder. Referral placed. -     SLEEP MEDICINE REFERRAL 2. Mild depression (HCC)improved with Prozac. Continue current dose of 40 mg. Has optimize most of her self-care. Continue exercise and seeing therapist.Patient Education:  Reviewed concept of depression as biochemical imbalance of neurotransmitters and rationale for treatment. Instructed patient to contact office or 911 promptly should condition worsen or any new symptoms appear and provided on-call telephone numbers. IF THE PATIENT HAS ANY SUICIDAL OR HOMICIDAL IDEATION, CALL THE OFFICE, DISCUSS WITH A SUPPORT MEMBER OR GO TO THE ER IMMEDIATELY. Patient was agreeable with this 3. Obesity (BMI 30-39.9) working on weight optimization 4. Depression with anxiety 
-     SLEEP MEDICINE REFERRAL 
-     FLUoxetine (PROZAC) 40 mg capsule; Take 1 Cap by mouth daily. 5. Anxiety 
-     FLUoxetine (PROZAC) 40 mg capsule; Take 1 Cap by mouth daily. Follow-up Disposition: 
Return in about 6 months (around 4/4/2019) for Physical - 30 minute appointment. Medication risks/benefits/costs/interactions/alternatives discussed with patient. Dimitri Mercado  was given an after visit summary which includes diagnoses, current medications, & vitals. she expressed understanding with the diagnosis and plan.

## 2018-10-04 NOTE — PATIENT INSTRUCTIONS
It was a pleasure to see you! As discussed: 
 
I'm glad you are better. Continue prozac. We discussed areas of self care that need optimization. Please see your self care inventory  Below and follow the suggested below. Your Self Care Inventory Self Care Inventory Sleep: 6 hrs/ night inconsistent Eating Habits: not on eating plan. Support: Yes Spiritual Life: Yes Exercise: >2.5hrs/ week Stress: Moderate Self Care Optimization Tips Sleep: -Increase Sleep to 7-9hrs/ night Eating Habits: Try eating \"mood foods\"  (see other diets below) Support: Try finding a therapist using the list provided and www. [employer]. Spiritual Life: Daily centering practice (Prayer/ Meditation/ Motivational Reading/ Carlota Sims walks) ; Consider rejoining a new mayra community Exercise: -Improve stress management (exercise at least 5mins/ day- goal at least 150mins/ a week) Stress Level: Make positive changes.  -Try mindfulness/ yoga- See [address] [facility] [place] Alcohol: Can worsen stress and mood disorders in excess amounts ( Women >7 drinks/ week; Men> 14 drinks/ week) Marijuana: Can worsen stress and mood disorders. Also there is a risk of cross contamination with other drugs when purchased from the streets. \"Brain Bypass Plan\" -SLEEP! Take Melatonin up to 1.0mg  Daily 2 hrs before bedtime  
-Take vitamin D 800-1000IU/ day  
-Exercise: (1st step join gym) I have given you a referral to the [facility] (Physician Referred Exercise program). Please visit: [email or web address] for more information. Recovering From Depression: Care Instructions Your Care Instructions Taking good care of yourself is important as you recover from depression. In time, your symptoms will fade as your treatment takes hold. Do not give up. Instead, focus your energy on getting better. Your mood will improve. It just takes some time.  Focus on things that can help you feel better, such as being with friends and family, eating well, and getting enough rest. But take things slowly. Do not do too much too soon. You will begin to feel better gradually. Follow-up care is a key part of your treatment and safety. Be sure to make and go to all appointments, and call your doctor if you are having problems. It's also a good idea to know your test results and keep a list of the medicines you take. How can you care for yourself at home? Be realistic · If you have a large task to do, break it up into smaller steps you can handle, and just do what you can. · You may want to put off important decisions until your depression has lifted. If you have plans that will have a major impact on your life, such as marriage, divorce, or a job change, try to wait a bit. Talk it over with friends and loved ones who can help you look at the overall picture first. 
· Reaching out to people for help is important. Do not isolate yourself. Let your family and friends help you. Find someone you can trust and confide in, and talk to that person. · Be patient, and be kind to yourself. Remember that depression is not your fault and is not something you can overcome with willpower alone. Treatment is necessary for depression, just like for any other illness. Feeling better takes time, and your mood will improve little by little. Stay active · Stay busy and get outside. Take a walk, or try some other light exercise. · Talk with your doctor about an exercise program. Exercise can help with mild depression. · Go to a movie or concert. Take part in a Baptism activity or other social gathering. Go to a ball game. · Ask a friend to have dinner with you. Take care of yourself · Eat a balanced diet with plenty of fresh fruits and vegetables, whole grains, and lean protein. If you have lost your appetite, eat small snacks rather than large meals. · Avoid drinking alcohol or using illegal drugs. Do not take medicines that have not been prescribed for you. They may interfere with medicines you may be taking for depression, or they may make your depression worse. · Take your medicines exactly as they are prescribed. You may start to feel better within 1 to 3 weeks of taking antidepressant medicine. But it can take as many as 6 to 8 weeks to see more improvement. If you have questions or concerns about your medicines, or if you do not notice any improvement by 3 weeks, talk to your doctor. · If you have any side effects from your medicine, tell your doctor. Antidepressants can make you feel tired, dizzy, or nervous. Some people have dry mouth, constipation, headaches, sexual problems, or diarrhea. Many of these side effects are mild and will go away on their own after you have been taking the medicine for a few weeks. Some may last longer. Talk to your doctor if side effects are bothering you too much. You might be able to try a different medicine. · Get enough sleep. If you have problems sleeping: ¨ Go to bed at the same time every night, and get up at the same time every morning. ¨ Keep your bedroom dark and quiet. ¨ Do not exercise after 5:00 p.m. ¨ Avoid drinks with caffeine after 5:00 p.m. · Avoid sleeping pills unless they are prescribed by the doctor treating your depression. Sleeping pills may make you groggy during the day, and they may interact with other medicine you are taking. · If you have any other illnesses, such as diabetes, heart disease, or high blood pressure, make sure to continue with your treatment. Tell your doctor about all of the medicines you take, including those with or without a prescription. · Keep the numbers for these national suicide hotlines: 5-672-319-TALK (8-329.109.4573) and 3-006-XNEKUME (4-417.467.7315). If you or someone you know talks about suicide or feeling hopeless, get help right away. When should you call for help? Call 911 anytime you think you may need emergency care. For example, call if: 
  · You feel like hurting yourself or someone else.  
  · Someone you know has depression and is about to attempt or is attempting suicide.  
[facility] your doctor now or seek immediate medical care if: 
  · You hear voices.  
  · Someone you know has depression and: 
¨ Starts to give away his or her possessions. ¨ Uses illegal drugs or drinks alcohol heavily. ¨ Talks or writes about death, including writing suicide notes or talking about guns, knives, or pills. ¨ Starts to spend a lot of time alone. ¨ Acts very aggressively or suddenly appears calm.  
 Watch closely for changes in your health, and be sure to contact your doctor if: 
  · You do not get better as expected. Where can you learn more? Go to http://jesus-travon.info/. Enter S465 in the search box to learn more about \"Recovering From Depression: Care Instructions. \" Current as of: December 7, 2017 Content Version: 11.8 © 0644-2374 Healthwise, Incorporated. Care instructions adapted under license by [employer] (which disclaims liability or warranty for this information). If you have questions about a medical condition or this instruction, always ask your healthcare professional. [address] any warranty or liability for your use of this information.

## 2018-10-04 NOTE — PROGRESS NOTES
Carroll Sandhu is a 33 y.o. female Chief Complaint Patient presents with  Anxiety  Medication Evaluation 1. Have you been to the ER, urgent care clinic since your last visit? Hospitalized since your last visit? Yes, patient was seen and treated at [facility] in [place] and was diagnosed with a URI 2. Have you seen or consulted any other health care providers outside of the [address] [place] [address] since your last visit? Include any pap smears or colon screening. No  
 
Visit Vitals  /72  Resp 16  
 Ht 5' 3\" (1.6 m)  Wt 181 lb 6.4 oz (82.3 kg)  SpO2 97%  BMI 32.13 kg/m2

## 2019-04-15 ENCOUNTER — OFFICE VISIT (OUTPATIENT)
Dept: INTERNAL MEDICINE CLINIC | Age: 29
End: 2019-04-15

## 2019-04-15 VITALS
RESPIRATION RATE: 16 BRPM | DIASTOLIC BLOOD PRESSURE: 70 MMHG | OXYGEN SATURATION: 98 % | TEMPERATURE: 98 F | WEIGHT: 191 LBS | BODY MASS INDEX: 33.84 KG/M2 | SYSTOLIC BLOOD PRESSURE: 110 MMHG | HEIGHT: 63 IN | HEART RATE: 78 BPM

## 2019-04-15 DIAGNOSIS — F41.9 ANXIETY: ICD-10-CM

## 2019-04-15 DIAGNOSIS — Z00.00 WELL WOMAN EXAM (NO GYNECOLOGICAL EXAM): Primary | ICD-10-CM

## 2019-04-15 DIAGNOSIS — J01.01 ACUTE RECURRENT MAXILLARY SINUSITIS: ICD-10-CM

## 2019-04-15 DIAGNOSIS — F41.8 DEPRESSION WITH ANXIETY: ICD-10-CM

## 2019-04-15 RX ORDER — FLUOXETINE HYDROCHLORIDE 40 MG/1
40 CAPSULE ORAL DAILY
Qty: 90 CAP | Refills: 1 | Status: SHIPPED | OUTPATIENT
Start: 2019-04-15

## 2019-04-15 RX ORDER — AMOXICILLIN AND CLAVULANATE POTASSIUM 875; 125 MG/1; MG/1
1 TABLET, FILM COATED ORAL EVERY 12 HOURS
Qty: 14 TAB | Refills: 0 | Status: SHIPPED | OUTPATIENT
Start: 2019-04-15 | End: 2019-04-22

## 2019-04-15 RX ORDER — FLUOXETINE 20 MG/1
20 TABLET ORAL DAILY
Qty: 90 TAB | Refills: 1 | Status: SHIPPED | OUTPATIENT
Start: 2019-04-15 | End: 2019-04-15 | Stop reason: SDUPTHER

## 2019-04-15 RX ORDER — FLUOXETINE 20 MG/1
20 TABLET ORAL DAILY
Qty: 90 TAB | Refills: 1 | Status: SHIPPED | OUTPATIENT
Start: 2019-04-15

## 2019-04-15 RX ORDER — FLUOXETINE HYDROCHLORIDE 40 MG/1
40 CAPSULE ORAL DAILY
Qty: 90 CAP | Refills: 1 | Status: SHIPPED | OUTPATIENT
Start: 2019-04-15 | End: 2019-04-15 | Stop reason: SDUPTHER

## 2019-04-15 NOTE — PROGRESS NOTES
HISTORY OF PRESENT ILLNESS  Alejo Mccormick is a 33 y.o. female for [facility]  Health Maintenance   Topic Date Due    PAP AKA CERVICAL CYTOLOGY  09/08/2018    Influenza Age 5 to Adult  08/01/2019    DTaP/Tdap/Td series (3 - Td) 06/09/2027    Pneumococcal 0-64 years  Aged Out       Sinus Infection    This is a recurrent problem. The current episode started more than 1 week ago. There has been no fever. The pain is mild. Associated symptoms include congestion, sinus pressure, sore throat, swollen glands, cough (green mucus, productive) and rhinorrhea. Pertinent negatives include no chills, no sweats, no ear pain, no shortness of breath and no chest pain. She has tried nothing for the symptoms. Anxiety  Patient is seen for anxiety disorder. Current treatment includes Prozac-increased to 60mg and individual therapy. Ongoing symptoms include: none. Patient denies: suicidal ideation, homocidal ideation. Reported side effects from the treatment: none. 2  SHx: matched in Family Medicine at [place]. Grandmother just passed away 4/1/19. Review of Systems   Constitutional: Negative for chills. HENT: Positive for congestion, rhinorrhea, sinus pressure and sore throat. Negative for ear pain. Respiratory: Positive for cough (green mucus, productive). Negative for shortness of breath. Cardiovascular: Negative for chest pain. Physical Exam   Constitutional: She is oriented to person, place, and time. She appears well-developed and well-nourished. HENT:   Right Ear: External ear normal.   Left Ear: External ear normal.   Nose: Mucosal edema present. No rhinorrhea or septal deviation. Right sinus exhibits maxillary sinus tenderness. Mouth/Throat: Oropharynx is clear and moist. No oropharyngeal exudate. Eyes: Conjunctivae are normal. No scleral icterus. Neck: Normal range of motion. Neck supple. No thyromegaly present. Cardiovascular: Normal rate, regular rhythm and normal heart sounds.  Exam reveals no gallop and no friction rub. No murmur heard. Pulmonary/Chest: Effort normal and breath sounds normal. No respiratory distress. She has no wheezes. She has no rales. She exhibits no tenderness. Abdominal: Soft. Bowel sounds are normal. She exhibits no distension. There is no tenderness. There is no rebound and no guarding. Genitourinary:   Genitourinary Comments: Deferred for gyn per pt request   Musculoskeletal: Normal range of motion. She exhibits no edema or tenderness. Neurological: She is alert and oriented to person, place, and time. ASSESSMENT and PLAN  Diagnoses and all orders for this visit:    1. Well woman exam (no gynecological exam)- Health Maintenance reviewed and addressed. Recent labs UTD. 2. Depression with anxiety- improved. Seeing therapist. Continue prozac 60mg. Patient Education:  Reviewed concept of depression as biochemical imbalance of neurotransmitters and rationale for treatment. Instructed patient to contact office or 911 promptly should condition worsen or any new symptoms appear and provided on-call telephone numbers. IF THE PATIENT HAS ANY SUICIDAL OR HOMICIDAL IDEATION, CALL THE OFFICE, DISCUSS WITH A SUPPORT MEMBER OR GO TO THE ER IMMEDIATELY. Patient was agreeable with this    -     FLUoxetine (PROZAC) 40 mg capsule; Take 1 Cap by mouth daily. (take with 20mg for total dose of 60mg)  -     FLUoxetine (PROZAC) 20 mg tablet; Take 1 Tab by mouth daily. (take with 40mg tab for total dose of 60mg)  3. Anxiety- no ativan use. -     FLUoxetine (PROZAC) 40 mg capsule; Take 1 Cap by mouth daily. (take with 20mg for total dose of 60mg)    4. Acute recurrent maxillary sinusitis- bacterial based on s/s.   -     amoxicillin-clavulanate (AUGMENTIN) 875-125 mg per tablet; Take 1 Tab by mouth every twelve (12) hours for 7 days. Medication risks/benefits/costs/interactions/alternatives discussed with patient.   Beatriz Cotton  was given an after visit summary which includes diagnoses, current medications, & vitals. she expressed understanding with the diagnosis and plan.

## 2019-04-15 NOTE — PATIENT INSTRUCTIONS
It was a pleasure to see you! As discussed:         Recovering From Depression: Care Instructions  Your Care Instructions  Taking good care of yourself is important as you recover from depression. In time, your symptoms will fade as your treatment takes hold. Do not give up. Instead, focus your energy on getting better. Your mood will improve. It just takes some time. Focus on things that can help you feel better, such as being with friends and family, eating well, and getting enough rest. But take things slowly. Do not do too much too soon. You will begin to feel better gradually. Follow-up care is a key part of your treatment and safety. Be sure to make and go to all appointments, and call your doctor if you are having problems. It's also a good idea to know your test results and keep a list of the medicines you take. How can you care for yourself at home? Be realistic  · If you have a large task to do, break it up into smaller steps you can handle, and just do what you can. · You may want to put off important decisions until your depression has lifted. If you have plans that will have a major impact on your life, such as marriage, divorce, or a job change, try to wait a bit. Talk it over with friends and loved ones who can help you look at the overall picture first.  · Reaching out to people for help is important. Do not isolate yourself. Let your family and friends help you. Find someone you can trust and confide in, and talk to that person. · Be patient, and be kind to yourself. Remember that depression is not your fault and is not something you can overcome with willpower alone. Treatment is necessary for depression, just like for any other illness. Feeling better takes time, and your mood will improve little by little. Stay active  · Stay busy and get outside. Take a walk, or try some other light exercise.   · Talk with your doctor about an exercise program. Exercise can help with mild depression. · Go to a movie or concert. Take part in a [employer] activity or other social gathering. Go to a ball game. · Ask a friend to have dinner with you. Take care of yourself  · Eat a balanced diet with plenty of fresh fruits and vegetables, whole grains, and lean protein. If you have lost your appetite, eat small snacks rather than large meals. · Avoid drinking alcohol or using illegal drugs. Do not take medicines that have not been prescribed for you. They may interfere with medicines you may be taking for depression, or they may make your depression worse. · Take your medicines exactly as they are prescribed. You may start to feel better within 1 to 3 weeks of taking antidepressant medicine. But it can take as many as 6 to 8 weeks to see more improvement. If you have questions or concerns about your medicines, or if you do not notice any improvement by 3 weeks, talk to your doctor. · If you have any side effects from your medicine, tell your doctor. Antidepressants can make you feel tired, dizzy, or nervous. Some people have dry mouth, constipation, headaches, sexual problems, or diarrhea. Many of these side effects are mild and will go away on their own after you have been taking the medicine for a few weeks. Some may last longer. Talk to your doctor if side effects are bothering you too much. You might be able to try a different medicine. · Get enough sleep. If you have problems sleeping:  ? Go to bed at the same time every night, and get up at the same time every morning. ? Keep your bedroom dark and quiet. ? Do not exercise after 5:00 p.m.  ? Avoid drinks with caffeine after 5:00 p.m. · Avoid sleeping pills unless they are prescribed by the doctor treating your depression. Sleeping pills may make you groggy during the day, and they may interact with other medicine you are taking.   · If you have any other illnesses, such as diabetes, heart disease, or high blood pressure, make sure to continue with your treatment. Tell your doctor about all of the medicines you take, including those with or without a prescription. · Keep the numbers for these national suicide hotlines: 1-598-539-TALK (2-787.345.1100) and 8-853-HYMLOQR (9-893.214.6485). If you or someone you know talks about suicide or feeling hopeless, get help right away. When should you call for help? Call 911 anytime you think you may need emergency care. For example, call if:    · You feel like hurting yourself or someone else.     · Someone you know has depression and is about to attempt or is attempting suicide.   [facility] your doctor now or seek immediate medical care if:    · You hear voices.     · Someone you know has depression and:  ? Starts to give away his or her possessions. ? Uses illegal drugs or drinks alcohol heavily. ? Talks or writes about death, including writing suicide notes or talking about guns, knives, or pills. ? Starts to spend a lot of time alone. ? Acts very aggressively or suddenly appears calm.    Watch closely for changes in your health, and be sure to contact your doctor if:    · You do not get better as expected. Where can you learn more? Go to http://jesus-travon.info/. Enter C113 in the search box to learn more about \"Recovering From Depression: Care Instructions. \"  Current as of: September 11, 2018  Content Version: 11.9  © 1104-9139 [employer], [employer]. Care instructions adapted under license by [employer] (which disclaims liability or warranty for this information). If you have questions about a medical condition or this instruction, always ask your healthcare professional. [place] [address] any warranty or liability for your use of this information.

## 2019-04-15 NOTE — PROGRESS NOTES
Identified pt with two pt identifiers(name and ). Reviewed record in preparation for visit and have obtained necessary documentation. All patient medications has been reviewed. Chief Complaint   Patient presents with    Complete Physical    Sinus Infection     x 1 week, nasal congestion, sneezing productive cough with green mucus        Health Maintenance Due   Topic    PAP AKA CERVICAL CYTOLOGY      Pap: done at [facility] location   Vitals:    04/15/19 1009   BP: 110/70   Pulse: 78   Resp: 16   Temp: 98 °F (36.7 °C)   TempSrc: Oral   SpO2: 98%   Weight: 191 lb (86.6 kg)   Height: 5' 3\" (1.6 m)   PainSc:   0 - No pain   LMP: 2019       Coordination of Care Questionnaire:  :   1) Have you been to an emergency room, urgent care, or hospitalized since your last visit?   no       2. Have seen or consulted any other health care provider since your last visit? NO    3) Do you have an Advanced Directive/ Living Will in place? NO  If yes, do we have a copy on file NO  If no, would you like information NO    Patient is accompanied by self I have received verbal consent from Chandrika Roldan to discuss any/all medical information while they are present in the room.

## 2019-08-02 ENCOUNTER — PATIENT MESSAGE (OUTPATIENT)
Dept: INTERNAL MEDICINE CLINIC | Age: 29
End: 2019-08-02

## 2021-11-02 NOTE — MR AVS SNAPSHOT
Visit Information Date & Time Provider Department Dept. Phone Encounter #  
 6/9/2017  8:45 AM Severo Pelletier MD [address] [address] Internal Medicine [phone] [id number] Follow-up Instructions Return in about 3 months (around 9/9/2017) for Follow-up In office (send Logic Instrument message in 4-6 weeks) . Upcoming Health Maintenance Date Due INFLUENZA AGE 9 TO ADULT 8/1/2017 PAP AKA CERVICAL CYTOLOGY 9/8/2018 DTaP/Tdap/Td series (2 - Td) 3/24/2019 Allergies as of 6/9/2017  Review Complete On: 6/9/2017 By: Severo Pelletier MD  
 No Known Allergies Current Immunizations  Reviewed on 11/24/2015 Name Date H1N1 FLU VACCINE 10/1/2009 Hepatitis A Vaccine 6/16/2009, 3/24/2009 Hepatitis B Vaccine 8/7/1992, 6/25/1992, 4/23/1992 Human Papillomavirus 10/3/2007, 3/20/2007, 10/24/2006 Influenza Vaccine 10/16/2014 MMR Vaccine 10/22/2002, 11/24/1992 Meningococcal Vaccine 3/24/2009 TD Vaccine 4/23/1993 TDAP Vaccine 3/24/2009 Tdap  Incomplete Not reviewed this visit You Were Diagnosed With   
  
 Codes Comments Psychophysiological insomnia    -  Primary ICD-10-CM: F51.04 
ICD-9-CM: 307.42 Depression with anxiety     ICD-10-CM: F41.8 ICD-9-CM: 300.4 Weight gain     ICD-10-CM: R63.5 ICD-9-CM: 783.1 Obesity (BMI 30-39. 9)     ICD-10-CM: E66.9 ICD-9-CM: 278.00 Encounter for immunization     ICD-10-CM: T54 ICD-9-CM: V03.89 Vitals BP Pulse Temp Resp Height(growth percentile) Weight(growth percentile) 114/72 (BP 1 Location: Right arm, BP Patient Position: Sitting) 73 98.3 °F (36.8 °C) (Oral) 16 5' 3\" (1.6 m) 170 lb 9.6 oz (77.4 kg) LMP SpO2 BMI OB Status Smoking Status 05/30/2017 99% 30.22 kg/m2 Having regular periods Never Smoker Vitals History BMI and BSA Data Body Mass Index Body Surface Area  
 30.22 kg/m 2 1.85 m 2 Preferred Pharmacy Pharmacy Name Phone [id number] [address] [address] [phone] Your Updated Medication List  
  
   
This list is accurate as of: 6/9/17 10:15 AM.  Always use your most recent med list.  
  
  
  
  
 APRI 0.15-0.03 mg Tab Generic drug:  desogestrel-ethinyl estradiol  
  
 bacitracin ointment Commonly known as:  BACITRACIN Apply  to affected area two (2) times a day. FLONASE 50 mcg/actuation nasal spray Generic drug:  fluticasone  
nightly. FLUoxetine 20 mg capsule Commonly known as:  PROzac Week 1: Take 1 tab by mouth in AM, Week 2 & beyond: take 2 tabs by mouth in AM  
  
 hydrOXYzine HCl 25 mg tablet Commonly known as:  ATARAX Take 1 Tab by mouth three (3) times daily as needed for Anxiety for up to 10 days. ibuprofen 800 mg tablet Commonly known as:  MOTRIN Take 1 Tab by mouth every eight (8) hours as needed (Headache). raNITIdine 300 mg tablet Commonly known as:  ZANTAC Take 1 Tab by mouth daily. Prescriptions Sent to Pharmacy Refills FLUoxetine (PROZAC) 20 mg capsule 1 Sig: Week 1: Take 1 tab by mouth in AM, Week 2 & beyond: take 2 tabs by mouth in AM  
 Class: Normal  
 Pharmacy: [address] [place] [address] [address] Ph #: [phone]  
 hydrOXYzine HCl (ATARAX) 25 mg tablet 0 Sig: Take 1 Tab by mouth three (3) times daily as needed for Anxiety for up to 10 days. Class: Normal  
 Pharmacy: [facility] [address] [place] [address] [address] Ph #: [phone] Route: Oral  
  
We Performed the Following CBC WITH AUTOMATED DIFF [02409 CPT(R)] HEMOGLOBIN A1C WITH EAG [37597 CPT(R)] METABOLIC PANEL, COMPREHENSIVE [22944 CPT(R)] MT IMMUNIZ ADMIN,1 SINGLE/COMB VAC/TOXOID [id number] CPT(R)]  TETANUS, DIPHTHERIA TOXOIDS AND ACELLULAR PERTUSSIS VACCINE (TDAP), IN INDIVIDS. >=7, IM [id number] CPT(R)] THYROID PANEL [id number] CPT(R)] TSH 3RD GENERATION [68503 CPT(R)] VITAMIN D, 25 HYDROXY [id number] CPT(R)] Follow-up Instructions Return in about 3 months (around 9/9/2017) for Follow-up In office (send Mediasmart message in 4-6 weeks) . Patient Instructions It was a pleasure to see you! As discussed: You received the tetanus/pertussis vaccine today. Please see vaccine handout for more information and let us know if you have any reactions or concerns after the vaccination. Anxiety I have prescribed hydroxyzine a nonhabit forming medication for use as needed for anxiety along with Prozac. Take as prescribed. Try eating \"mood foods\" Exercise at least 5 mins a day See below for more information Anxiety Disorder: Care Instructions Your Care Instructions Anxiety is a normal reaction to stress. Difficult situations can cause you to have symptoms such as sweaty palms and a nervous feeling. In an anxiety disorder, the symptoms are far more severe. Constant worry, muscle tension, trouble sleeping, nausea and diarrhea, and other symptoms can make normal daily activities difficult or impossible. These symptoms may occur for no reason, and they can affect your work, school, or social life. Medicines, counseling, and self-care can all help. Follow-up care is a key part of your treatment and safety. Be sure to make and go to all appointments, and call your doctor if you are having problems. It's also a good idea to know your test results and keep a list of the medicines you take. How can you care for yourself at home? · Take medicines exactly as directed. Call your doctor if you think you are having a problem with your medicine. · Go to your counseling sessions and follow-up appointments. · Recognize and accept your anxiety. Then, when you are in a situation that makes you anxious, say to yourself, \"This is not an emergency.  I feel uncomfortable, but I am not in danger. I can keep going even if I feel anxious. \" · Be kind to your body: ¨ Relieve tension with exercise or a massage. ¨ Get enough rest. 
¨ Avoid alcohol, caffeine, nicotine, and illegal drugs. They can increase your anxiety level and cause sleep problems. ¨ Learn and do relaxation techniques. See below for more about these techniques. · Engage your mind. Get out and do something you enjoy. Go to a funny movie, or take a walk or hike. Plan your day. Having too much or too little to do can make you anxious. · Keep a record of your symptoms. Discuss your fears with a good friend or family member, or join a support group for people with similar problems. Talking to others sometimes relieves stress. · Get involved in social groups, or volunteer to help others. Being alone sometimes makes things seem worse than they are. · Get at least 30 minutes of exercise on most days of the week to relieve stress. Walking is a good choice. You also may want to do other activities, such as running, swimming, cycling, or playing tennis or team sports. Relaxation techniques Do relaxation exercises 10 to 20 minutes a day. You can play soothing, relaxing music while you do them, if you wish. · Tell others in your house that you are going to do your relaxation exercises. Ask them not to disturb you. · Find a comfortable place, away from all distractions and noise. · Lie down on your back, or sit with your back straight. · Focus on your breathing. Make it slow and steady. · Breathe in through your nose. Breathe out through either your nose or mouth. · Breathe deeply, filling up the area between your navel and your rib cage. Breathe so that your belly goes up and down. · Do not hold your breath. · Breathe like this for 5 to 10 minutes. Notice the feeling of calmness throughout your whole body.  
As you continue to breathe slowly and deeply, relax by doing the following for another 5 to 10 minutes: · Tighten and relax each muscle group in your body. You can begin at your toes and work your way up to your head. · Imagine your muscle groups relaxing and becoming heavy. · Empty your mind of all thoughts. · Let yourself relax more and more deeply. · Become aware of the state of calmness that surrounds you. · When your relaxation time is over, you can bring yourself back to alertness by moving your fingers and toes and then your hands and feet and then stretching and moving your entire body. Sometimes people fall asleep during relaxation, but they usually wake up shortly afterward. · Always give yourself time to return to full alertness before you drive a car or do anything that might cause an accident if you are not fully alert. Never play a relaxation tape while you drive a car. When should you call for help? Call 911 anytime you think you may need emergency care. For example, call if: 
· You feel you cannot stop from hurting yourself or someone else. Keep the numbers for these national suicide hotlines: 2-886-609-TALK (2-721.265.6939) and 6-043-RSPSWZU (7-251.222.9475). If you or someone you know talks about suicide or feeling hopeless, get help right away. Watch closely for changes in your health, and be sure to contact your doctor if: 
· You have anxiety or fear that affects your life. · You have symptoms of anxiety that are new or different from those you had before. Where can you learn more? Go to http://jesus-travon.info/. Enter P754 in the search box to learn more about \"Anxiety Disorder: Care Instructions. \" Current as of: July 26, 2016 Content Version: 11.2 © 2107-5788 [employer]. Care instructions adapted under license by [employer] (which disclaims liability or warranty for this information).  If you have questions about a medical condition or this instruction, always ask your healthcare professional. [employer] disclaims any warranty or liability for your use of this information. Introducing [facility]! Dear Kennedy Henry: Thank you for requesting a Piqora account. Our records indicate that you already have an active Piqora account. You can access your account anytime at [email or web address] Did you know that you can access your hospital and ER discharge instructions at any time in Piqora? You can also review all of your test results from your hospital stay or ER visit. Additional Information If you have questions, please visit the Frequently Asked Questions section of the Piqora website at [email or web address] Remember, Piqora is NOT to be used for urgent needs. For medical emergencies, dial 911. Now available from your iPhone and Android! Please provide this summary of care documentation to your next provider. Your primary care clinician is listed as Maritza Bustillos. If you have any questions after today's visit, please call [phone]. denies dentures and loose teeth